# Patient Record
Sex: FEMALE | Race: WHITE | NOT HISPANIC OR LATINO | Employment: PART TIME | URBAN - METROPOLITAN AREA
[De-identification: names, ages, dates, MRNs, and addresses within clinical notes are randomized per-mention and may not be internally consistent; named-entity substitution may affect disease eponyms.]

---

## 2023-06-23 ENCOUNTER — LAB REQUISITION (OUTPATIENT)
Dept: LAB | Facility: CLINIC | Age: 18
End: 2023-06-23
Payer: COMMERCIAL

## 2023-06-23 DIAGNOSIS — L70.0 ACNE VULGARIS: ICD-10-CM

## 2023-06-23 LAB
ALBUMIN SERPL BCG-MCNC: 4.4 G/DL (ref 3.5–5.2)
ALP SERPL-CCNC: 66 U/L (ref 35–104)
ALT SERPL W P-5'-P-CCNC: 16 U/L (ref 0–50)
AST SERPL W P-5'-P-CCNC: 37 U/L (ref 0–45)
BASOPHILS # BLD AUTO: 0 10E3/UL (ref 0–0.2)
BASOPHILS NFR BLD AUTO: 0 %
BILIRUB DIRECT SERPL-MCNC: <0.2 MG/DL (ref 0–0.3)
BILIRUB SERPL-MCNC: 0.4 MG/DL
CHOLEST SERPL-MCNC: 140 MG/DL
EOSINOPHIL # BLD AUTO: 0.1 10E3/UL (ref 0–0.7)
EOSINOPHIL NFR BLD AUTO: 2 %
ERYTHROCYTE [DISTWIDTH] IN BLOOD BY AUTOMATED COUNT: 13 % (ref 10–15)
HCT VFR BLD AUTO: 40.8 % (ref 35–47)
HDLC SERPL-MCNC: 42 MG/DL
HGB BLD-MCNC: 14 G/DL (ref 11.7–15.7)
IMM GRANULOCYTES # BLD: 0 10E3/UL
IMM GRANULOCYTES NFR BLD: 1 %
LDLC SERPL CALC-MCNC: 88 MG/DL
LYMPHOCYTES # BLD AUTO: 2 10E3/UL (ref 0.8–5.3)
LYMPHOCYTES NFR BLD AUTO: 42 %
MCH RBC QN AUTO: 30.2 PG (ref 26.5–33)
MCHC RBC AUTO-ENTMCNC: 34.3 G/DL (ref 31.5–36.5)
MCV RBC AUTO: 88 FL (ref 78–100)
MONOCYTES # BLD AUTO: 0.4 10E3/UL (ref 0–1.3)
MONOCYTES NFR BLD AUTO: 8 %
NEUTROPHILS # BLD AUTO: 2.2 10E3/UL (ref 1.6–8.3)
NEUTROPHILS NFR BLD AUTO: 47 %
NONHDLC SERPL-MCNC: 98 MG/DL
NRBC # BLD AUTO: 0 10E3/UL
NRBC BLD AUTO-RTO: 0 /100
PLATELET # BLD AUTO: NORMAL 10*3/UL
PROT SERPL-MCNC: 7.1 G/DL (ref 6.4–8.3)
RBC # BLD AUTO: 4.64 10E6/UL (ref 3.8–5.2)
TRIGL SERPL-MCNC: 52 MG/DL
WBC # BLD AUTO: 4.7 10E3/UL (ref 4–11)

## 2023-06-23 PROCEDURE — 80076 HEPATIC FUNCTION PANEL: CPT | Performed by: NURSE PRACTITIONER

## 2023-06-23 PROCEDURE — 85014 HEMATOCRIT: CPT | Performed by: NURSE PRACTITIONER

## 2023-06-23 PROCEDURE — 85048 AUTOMATED LEUKOCYTE COUNT: CPT | Performed by: NURSE PRACTITIONER

## 2023-06-23 PROCEDURE — 80061 LIPID PANEL: CPT | Mod: ORL | Performed by: NURSE PRACTITIONER

## 2023-10-02 ENCOUNTER — HOSPITAL ENCOUNTER (EMERGENCY)
Facility: CLINIC | Age: 18
Discharge: HOME | End: 2023-10-03
Attending: EMERGENCY MEDICINE | Admitting: EMERGENCY MEDICINE
Payer: COMMERCIAL

## 2023-10-02 DIAGNOSIS — F43.23 ADJUSTMENT DISORDER WITH MIXED ANXIETY AND DEPRESSED MOOD: ICD-10-CM

## 2023-10-02 PROCEDURE — 99285 EMERGENCY DEPT VISIT HI MDM: CPT | Performed by: EMERGENCY MEDICINE

## 2023-10-02 PROCEDURE — 99284 EMERGENCY DEPT VISIT MOD MDM: CPT | Performed by: EMERGENCY MEDICINE

## 2023-10-02 RX ORDER — CEFDINIR 300 MG/1
300 CAPSULE ORAL
COMMUNITY
Start: 2023-09-27 | End: 2023-10-04

## 2023-10-02 RX ORDER — ISOTRETINOIN 30 MG/1
60 CAPSULE ORAL
COMMUNITY
Start: 2023-09-23

## 2023-10-02 RX ORDER — SERTRALINE HYDROCHLORIDE 25 MG/1
TABLET, FILM COATED ORAL
COMMUNITY
Start: 2023-08-24

## 2023-10-02 RX ORDER — DEXTROAMPHETAMINE SACCHARATE, AMPHETAMINE ASPARTATE MONOHYDRATE, DEXTROAMPHETAMINE SULFATE AND AMPHETAMINE SULFATE 2.5; 2.5; 2.5; 2.5 MG/1; MG/1; MG/1; MG/1
10 CAPSULE, EXTENDED RELEASE ORAL
COMMUNITY
Start: 2023-09-06

## 2023-10-02 ASSESSMENT — ACTIVITIES OF DAILY LIVING (ADL): ADLS_ACUITY_SCORE: 35

## 2023-10-03 VITALS
HEART RATE: 74 BPM | RESPIRATION RATE: 16 BRPM | SYSTOLIC BLOOD PRESSURE: 114 MMHG | TEMPERATURE: 97.6 F | DIASTOLIC BLOOD PRESSURE: 69 MMHG | BODY MASS INDEX: 28.28 KG/M2 | HEIGHT: 66 IN | OXYGEN SATURATION: 98 % | WEIGHT: 176 LBS

## 2023-10-03 PROBLEM — F90.9 ATTENTION DEFICIT HYPERACTIVITY DISORDER: Status: ACTIVE | Noted: 2023-10-03

## 2023-10-03 PROBLEM — F43.21 ADJUSTMENT DISORDER WITH DEPRESSED MOOD: Status: ACTIVE | Noted: 2023-10-03

## 2023-10-03 ASSESSMENT — ACTIVITIES OF DAILY LIVING (ADL): ADLS_ACUITY_SCORE: 35
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
## 2023-10-03 NOTE — CONSULTS
"Diagnostic Evaluation Consultation  Crisis Assessment    Patient Name: Wendi Vásquez  Age:  18 year old  Legal Sex: female  Gender Identity: female  Pronouns:   Race: White  Ethnicity: Not  or   Language: English      Patient was assessed: In person      Patient location: [facility] EMERGENCY DEPARTMENT                                 Referral Data and Chief Complaint  Wendi Vásquez presents to the ED with family/friends. Patient is presenting to the ED for the following concerns: Depression, Suicidal ideation.   Factors that make the mental health crisis life threatening or complex are:  Patient walked in with mom due to SI.  She denied having specific plans and intent.  She said the SI was \"on and off.\"  She denied NSSI and HI.  She denied prior suicide attempts.  Patient was sleepy, but oriented x 5.  She was calm and cooperative.  Patient said, \"My parents recently  and I'm having relationship issues with my girlfriend.  I'm so conflicted.  I want to continue dating Patience but there's toxic tendanceis.  I wanna explore other things.  She wants to tie me down.  II feel super-suffocated.  I wanna experiment with other people.  I went dancing right after my mom's engagement shoot.  I felt cared for and safe with that person.  I feel so guilty.  Patience is crazy and very controlling.  I spiraled today.  It's gotten worse, throughout the day.  I have to break up with her.  I tried twice.  I'm sick of it.  I have black and white thinking.  I can't take it.  I'm really codependent.  People say I don't need to make a decision right now, but I don't like to be in that gray area.  I'd want to force myself.  Life or suicide.  This is the worst I had SI in my entire life.  I wouldn't trust myself. I want to leave and I'm going to stay at my mom's.  I probably need a group.  My therapist is great.\"  Patient has been sleeping and eating, adequately.  She thought maybe she had nightmares.  " She stated that she dissociates, a lot.  She denied having flashbacks.  The most traumatic thing in her life was her parent's divorce.  It was a complete surpise to her.  She denied psychosis and richard.  Her insight, judgment, and impulse control were mostly intact at the time of this assessment..      Informed Consent and Assessment Methods  Explained the crisis assessment process, including applicable information disclosures and limits to confidentiality, assessed understanding of the process, and obtained consent to proceed with the assessment.  Assessment methods included conducting a formal interview with patient, review of medical records, collaboration with medical staff, and obtaining relevant collateral information from family and community providers when available.  : done     Patient response to interventions: acceptance expressed  Coping skills were attempted to reduce the crisis:  requested help and support.     History of the Crisis   Patient had prior diagnoses of Depression and ADHD.  There was no concern for substance use.  She takes prescribed meds.  She has not been admitted to a MH inpatient unit, before.  This was her first time in the ED for MH symptoms.  She has not gone through any intensive outpatient programs, before.    Brief Psychosocial History  Family:  Single, Children no  Support System:  Parent(s)  Employment Status:  employed full-time, student  Source of Income:  salary/wages  Financial Environmental Concerns:  No concerns identified  Current Hobbies:  social media/computer activities  Barriers in Personal Life:  mental health concerns  Patient lives with her girlfriend of almost two years, Patience and another couple who are male and female (patient noted.)  Mom said patient prefers she/they/he pronouns and goes by [employer]  Patient works at [employer].    Significant Clinical History  Current Anxiety Symptoms:  anxious  Current Depression/Trauma:  thoughts of death/suicide, excessive  guilt, sadness, hopelessness, helplessness, sense of doom  Current Somatic Symptoms:  anxious  Current Psychosis/Thought Disturbance:     Current Eating Symptoms:     Chemical Use History:  Alcohol: None  Benzodiazepines: None  Opiates: None  Cocaine: None  Marijuana: None  Other Use: None   Past diagnosis:  ADHD, Depression  Family history:  Substance Use Disorder  Past treatment:  Individual therapy, Primary Care  Details of most recent treatment:  Patient's med provider/PCP is retiring and she's agreeable to a Psychiatrist for med mgmt.  She has a therapist.  She wants group therapy.     Collateral Information  Is there collateral information: Yes     Collateral information name, relationship, phone number:  Shagufta Vásquez, mother, [phone], in person    What happened today: Mom got a call from patient's sister stating that patient was suicidal.  Mom said she'd meet her at the emergency room.  Patient struggles with her relationship with Patience and mom and dad's divorce.     What is different about patient's functioning: Mom's twin sister's were born and life has been lived out loud, mom said.     Concern about alcohol/drug use:      What do you think the patient needs:      Has patient made comments about wanting to kill themselves/others: yes    If d/c is recommended, can they take part in safety/aftercare planning:  yes    Additional collateral information:  Mom is a nurse at the [employer].  She said this is very difficult with the divorce on top of it.  She recently got engaged to a woman and she's the happiest she's ever been.  Patient's dad lives a mile away from her in [place] [facility].  He's funny, fun, has a great personality, but he's a chronic alcoholic.     Risk Assessment  [place] Suicide Severity Rating Scale Full Clinical Version:  Suicidal Ideation  Q1 Wish to be Dead (Lifetime): Yes  Q2 Non-Specific Active Suicidal Thoughts (Lifetime): Yes  3. Active Suicidal Ideation with any Methods (Not Plan) Without  Intent to Act (Lifetime): No  Q4 Active Suicidal Ideation with Some Intent to Act, Without Specific Plan (Lifetime): No  Q5 Active Suicidal Ideation with Specific Plan and Intent (Lifetime): No  Q6 Suicide Behavior (Lifetime): no     Suicidal Behavior (Lifetime)  Actual Attempt (Lifetime): No  Has subject engaged in non-suicidal self-injurious behavior? (Lifetime): No  Interrupted Attempts (Lifetime): No  Aborted or Self-Interrupted Attempt (Lifetime): No  Preparatory Acts or Behavior (Lifetime): No    [place] Suicide Severity Rating Scale Recent:   Suicidal Ideation (Recent)  Q1 Wished to be Dead (Past Month): yes  Q2 Suicidal Thoughts (Past Month): yes  Q3 Suicidal Thought Method: no  Q4 Suicidal Intent without Specific Plan: yes  Q5 Suicide Intent with Specific Plan: no  Level of Risk per Screen: high risk  Intensity of Ideation (Recent)  Most Severe Ideation Rating (Past 1 Month):  (patient said she was too tired to answer more questions.)  Suicidal Behavior (Recent)  Actual Attempt (Past 3 Months): No  Has subject engaged in non-suicidal self-injurious behavior? (Past 3 Months): No  Interrupted Attempts (Past 3 Months): No  Aborted or Self-Interrupted Attempt (Past 3 Months): No  Preparatory Acts or Behavior (Past 3 Months): No    Environmental or Psychosocial Events: challenging interpersonal relationships, other life stressors  Protective Factors: Protective Factors: help seeking    Does the patient have thoughts of harming others? Feels Like Hurting Others: no  Previous Attempt to Hurt Others: no  Is the patient engaging in sexually inappropriate behavior?: no    Is the patient engaging in sexually inappropriate behavior?  no        Mental Status Exam   Affect: Appropriate  Appearance: Appropriate  Attention Span/Concentration: Attentive  Eye Contact: Engaged    Fund of Knowledge: Appropriate   Language /Speech Content: Fluent  Language /Speech Volume: Normal  Language /Speech Rate/Productions:  "Normal  Recent Memory: Variable  Remote Memory: Variable  Mood: Sad, Depressed  Orientation to Person: Yes   Orientation to Place: Yes  Orientation to Time of Day: Yes  Orientation to Date: Yes     Situation (Do they understand why they are here?): Yes  Psychomotor Behavior: Normal  Thought Content: Clear, Suicidal  Thought Form: Intact       Medication  Psychotropic medications: Listed in med section of Epic.    Current Care Team  Patient Care Team:  Pediatrics, [facility] as PCP - General  Jo Chen, Therapist, [facility]    Diagnosis  Patient Active Problem List   Diagnosis Code    Adjustment disorder with depressed mood F43.21    Attention deficit hyperactivity disorder F90.9       Primary Problem This Admission  Active Hospital Problems    *Adjustment disorder with depressed mood      Attention deficit hyperactivity disorder        Clinical Summary and Substantiation of Recommendations   After therapeutic assessment, intervention and aftercare planning by ED care team and LM and in consultation with attending provider, the patient's circumstances and mental state were appropriate for outpatient management. It is the recommendation of this clinician that pt discharge with OP MH support. A this time the pt is not presenting as an acute risk to self or others due to the following factors: Patient said, \"Now I have a plan for tonight.  I'm going to my mom's.\"  She feels safe at mom's.  She denied current SI, NSSI, and HI.  She did not have psychosis or richard.  She was not aggressive.       Patient coping skills attempted to reduce the crisis:  requested help and support.    Disposition  Recommended disposition: Programmatic Care, Medication Management        Reviewed case and recommendations with attending provider. Attending Name: Laurence Peña MD/Leon Pinedo MD       Attending concurs with disposition: yes       Patient and/or validated legal guardian concurs with disposition:   yes       Final " disposition:  discharge    Legal status on admission: Voluntary/Patient has signed consent for treatment    Assessment Details   Total duration spent with the patient: 60 min     CPT code(s) utilized: 75798 - Psychotherapy for Crisis - 60 (30-74*) min    ALISHA Valencia, Psychotherapist  DEC - Triage & Transition Services  Callback: [phone]    Therapy Plans     ADULT MH EVAL  Date:   10/10/2023  Time:   2:00 PM   Length: 120 minutes  Provider: Jessi oHff, RA, LEENA      You have been scheduled with the [facility] for a Diagnostic Assessment appointment on 10/10/2023 date at 2PM  . Please allow up to 90 -120 minutes for your appointment. This is an IN-[place]ERSON [place] [address]     *Follow the signs to the Emergency Room and park in the Yellow or Red Ramp.  You can obtain a reduced parking fee of $2 after your appointment.  The office is located next to [facility].  The  office number is [phone].     Please arrive before you scheduled appointment time, late arrivals are subject to the need to reschedule.   Please bring contact names and phone numbers for Emergency Contacts, therapist, psychiatrist, PO, attorneys, or other relevant contacts that may be needed.     *Face masking is optional.     Please note: Parents must accompany any patient under the age of 18. Any patient under legal guardianship will need to bring court documents.     Child/ Adolescent appointments can last up to 120+ minutes.  Adult appointments can last up to 90+ minutes.     You will receive a phone call 2-4 days prior to your scheduled time to confirm and remind you of the appointment.       You will receive intake forms via [employer] ([email or web address]) to be completed prior to your appointment.  If able, please complete this prior to your appointment to reduce the appt length of  "time.       If you need to change this appointment for any reason, please call our Behavioral Access Scheduling office at [phone].  Please note, we ask for at least a 24-hour notice.  Any late cancelations will be considered a no-show.                    Scheduled Appointment  Date: Thursday, 10/5/2023    Time: 1:00 pm - 2:00 pm  Provider: Ariel Cabrera MA, CNPRN  Location: [facility], [address] [place] [address], [address], [place] [address]  Phone: [phone]  Type: Telepsychiatry     Patient Instructions  Please fill New Patient Form by using following link. All forms need to be completed 24 hours prior to the appointment date/time by going to [email or web address] Please call us on [phone] 96 hours prior to your scheduled appointment to confirm that you are able to attend. We will provide you information about how to log into video call software when you call.     Aftercare Plan  If I am feeling unsafe or I am in a crisis, I will:   Contact my established care providers   Call the National Suicide Prevention Lifeline: 988  Go to the nearest emergency room   Call 911      Warning signs that I or other people might notice when a crisis is developing for me: \"feeling guilty, black and white thinking, suicidal\"     Things I am able to do on my own to cope or help me feel better: \"Seeing my therapist helps.\"      Things that I am able to do with others to cope or help me better: \"I'll stay with my mom, tonight.\"      Things I can use or do for distraction: Work, school      Changes I can make to support my mental health and wellness: \"start going to a group\"      People in my life that I can ask for help: \"mom, therapist\"      Your county has a mental health crisis team you can call 24/7: [facility] Crisis  [phone] (adults)  [phone] (children)  and [facility] Crisis [phone]     Other things that are important when I'm in crisis: \"Get enough sleep, eat " "balanced meals, drink enough fluids\"      Additional resources and information:      Reduce Extreme Emotion  QUICKLY:  Changing Your Body Chemistry      T:  Change your body Temperature to change your autonomic nervous system   Use Ice Water to calm yourself down FAST   Put your face in a bowl of ice water (this is the best way; have the person keep his/her face in ice water for 30-45 seconds - initial research is showing that the longer s/he can hold her/his face in the water, the better the response), or   Splash ice water on your face, or hold an ice pack on your face      I:  Intensely exercise to calm down a body revved up by emotion   Examples: running, walking fast, jumping, playing basketball, weight lifting, swimming, calisthenics, etc.   Engage in exercises that DO NOT include violent behaviors. Exercises that utilize violent behaviors tend to function as  behavioral rehearsal,  and rather than calming the person down, may actually  rev  the person up more, increasing the likelihood of violence, and lessening the likelihood that they will  burn off  energy     P:  Progressively relax your muscles   Starting with your hands, moving to your forearms, upper arms, shoulders, neck, forehead, eyes, cheeks and lips, tongue and teeth, chest, upper back, stomach, buttocks, thighs, calves, ankles, feet   Tense (10 seconds,   of the way), then relax each muscle (all the way)   Notice the tension   Notice the difference when relaxed (by tensing first, and then relaxing, you are able to get a more thorough relaxation than by simply relaxing)      P: Paced breathing to relax   The standard technique is to begin with counting the number of steps one takes for a typical inhale, then counting the steps one takes for a typical exhale, and then lengthening the amount of steps for the exhalation by one or two steps.  OR  Repeat this pattern for 1-2 minutes  Inhale for four (4) seconds   Exhale for six (6) to eight (8) seconds " "  Research demonstrated that one can change one's overall level of anxiety by doing this exercise for even a few minutes per day            Crisis Lines  Crisis Text Line  Text 242316  You will be connected with a trained live crisis counselor to provide support.     Jean baumann, anthonyo  BRIAN a 712208 o texto a 442-AYUDAME en WhatsApp     The [employer] (LGBTQ Youth Crisis Line)  8.689.824.4428  text START to 802-317        Community Threadflip  Fast Tracker  Linking people to mental health and substance use disorder resources  ImaCor.Xenoport      [place] Mental Health Warm Line  Peer to peer support  Monday thru Saturday, 12 pm to 10 pm  197.814.5264 or 2.406.721.7142  Text \"Support\" to 07667     National Ada on Mental Illness (JC)  577.216.9969 or 1.888.JC.HELPS  They offer groups and family support.        Mental Health Apps  My3  https://RxRevu.org/     VirtualHopeBox  https://Qudini/apps/virtual-hope-box/        Additional Information  Today you were seen by a licensed mental health professional through Triage and Transition services, Behavioral Healthcare Providers ([facility])  for a crisis assessment in the Emergency Department at [facility].  It is recommended that you follow up with your established providers (psychiatrist, mental health therapist, and/or primary care doctor - as relevant) as soon as possible. Coordinators from [facility] will be calling you in the next 24-48 hours to ensure that you have the resources you need.  You can also contact [facility] coordinators directly at [phone]. You may have been scheduled for or offered an appointment with a mental health provider. [facility] maintains an extensive network of licensed behavioral health providers to connect patients with the services they need.  We do not charge providers a fee to participate in our referral network.  We match patients with providers based on a patient's specific needs, insurance coverage, and " location.  Our first effort will be to refer you to a provider within your care system, and will utilize providers outside your care system as needed.

## 2023-10-03 NOTE — DISCHARGE INSTRUCTIONS
Continue working with your current therapist.  See if they can see you twice weekly.     Stay with your mother tonight and as needed to give yourself a safe and supportive space.     Therapy Plans    ADULT MH EVAL  Date: 10/10/2023  Time: 2:00 PM   Length: 120 minutes  Provider: Jessi Hoff, RA, DIVYAC     You have been scheduled with the [facility] for a Diagnostic Assessment appointment on 10/10/2023 date at 2PM  . Please allow up to 90 -120 minutes for your appointment. This is an [place] [address]    *Follow the signs to the Emergency Room and park in the Yellow or Red Ramp.  You can obtain a reduced parking fee of $2 after your appointment.  The office is located next to [facility].  The  office number is [phone].    Please arrive before you scheduled appointment time, late arrivals are subject to the need to reschedule.   Please bring contact names and phone numbers for Emergency Contacts, therapist, psychiatrist, PO, attorneys, or other relevant contacts that may be needed.    *Face masking is optional.    Please note: Parents must accompany any patient under the age of 18. Any patient under legal guardianship will need to bring court documents.    Child/ Adolescent appointments can last up to 120+ minutes.  Adult appointments can last up to 90+ minutes.    You will receive a phone call 2-4 days prior to your scheduled time to confirm and remind you of the appointment.      You will receive intake forms via [employer] ([email or web address]) to be completed prior to your appointment.  If able, please complete this prior to your appointment to reduce the appt length of time.      If you need to change this appointment for any reason, please call our Behavioral Access Scheduling office at [phone].  Please note, we ask for at least a 24-hour notice.  Any late  "cancelations will be considered a no-show.         Scheduled Appointment  Date: Thursday, 10/5/2023    Time: 1:00 pm - 2:00 pm  Provider: Ariel Cabrera MA, CNP,RN  Location: [facility], [address] [place] [address], [address], [place] [address]  Phone: [phone]  Type: Telepsychiatry    Patient Instructions  Please fill New Patient Form by using following link. All forms need to be completed 24 hours prior to the appointment date/time by going to [email or web address] Please call us on [phone] 96 hours prior to your scheduled appointment to confirm that you are able to attend. We will provide you information about how to log into video call software when you call.    Aftercare Plan  If I am feeling unsafe or I am in a crisis, I will:   Contact my established care providers   Call the National Suicide Prevention Lifeline: 988  Go to the nearest emergency room   Call 911     Warning signs that I or other people might notice when a crisis is developing for me: \"feeling guilty, black and white thinking, suicidal\"    Things I am able to do on my own to cope or help me feel better: \"Seeing my therapist helps.\"     Things that I am able to do with others to cope or help me better: \"I'll stay with my mom, tonight.\"     Things I can use or do for distraction: Work, school     Changes I can make to support my mental health and wellness: \"start going to a group\"     People in my life that I can ask for help: \"mom, therapist\"     Your county has a mental health crisis team you can call 24/7: [facility]  [phone] (adults)  [phone] (children)  and [facility] [phone]    Other things that are important when I'm in crisis: \"Get enough sleep, eat balanced meals, drink enough fluids\"     Additional resources and information:     Reduce Extreme Emotion  QUICKLY:  Changing Your Body Chemistry      T:  Change your body Temperature to change your autonomic nervous " system   Use Ice Water to calm yourself down FAST   Put your face in a bowl of ice water (this is the best way; have the person keep his/her face in ice water for 30-45 seconds - initial research is showing that the longer s/he can hold her/his face in the water, the better the response), or   Splash ice water on your face, or hold an ice pack on your face      I:  Intensely exercise to calm down a body revved up by emotion   Examples: running, walking fast, jumping, playing basketball, weight lifting, swimming, calisthenics, etc.   Engage in exercises that DO NOT include violent behaviors. Exercises that utilize violent behaviors tend to function as  behavioral rehearsal,  and rather than calming the person down, may actually  rev  the person up more, increasing the likelihood of violence, and lessening the likelihood that they will  burn off  energy     P:  Progressively relax your muscles   Starting with your hands, moving to your forearms, upper arms, shoulders, neck, forehead, eyes, cheeks and lips, tongue and teeth, chest, upper back, stomach, buttocks, thighs, calves, ankles, feet   Tense (10 seconds,   of the way), then relax each muscle (all the way)   Notice the tension   Notice the difference when relaxed (by tensing first, and then relaxing, you are able to get a more thorough relaxation than by simply relaxing)     P: Paced breathing to relax   The standard technique is to begin with counting the number of steps one takes for a typical inhale, then counting the steps one takes for a typical exhale, and then lengthening the amount of steps for the exhalation by one or two steps.  OR  Repeat this pattern for 1-2 minutes  Inhale for four (4) seconds   Exhale for six (6) to eight (8) seconds   Research demonstrated that one can change one's overall level of anxiety by doing this exercise for even a few minutes per day          Crisis Lines  Crisis Text Line  Text 230822  You will be connected with a  "trained live crisis counselor to provide support.    Por pastor, texto  BRIAN a 412511 o texto a 442-AYUDAME en WhatsApp    [employer] (LGBTQ Youth Crisis Line)  2.820.866.2782  text START to 844-063      Community Resources  Fast Tracker  Linking people to mental health and substance use disorder resources  [employer]iPling     [place] Mental Health Warm Line  Peer to peer support  Monday thru Saturday, 12 pm to 10 pm  436.861.9487 or 4.417.985.1779  Text \"Support\" to 38016    National Bouse on Mental Illness (JC)  301.856.4757 or 1.888.JC.HELPS  They offer groups and family support.      Mental Health Apps  My3  https://[employer].org/    VirtualHopeBox  https://[employer]/apps/virtual-hope-box/      Additional Information  Today you were seen by a licensed mental health professional through Triage and Transition services, [facility] ([facility])  for a crisis assessment in the Emergency Department at [facility].  It is recommended that you follow up with your established providers (psychiatrist, mental health therapist, and/or primary care doctor - as relevant) as soon as possible. Coordinators from [facility] will be calling you in the next 24-48 hours to ensure that you have the resources you need.  You can also contact [facility] coordinators directly at [phone]. You may have been scheduled for or offered an appointment with a mental health provider. [facility] maintains an extensive network of licensed behavioral health providers to connect patients with the services they need.  We do not charge providers a fee to participate in our referral network.  We match patients with providers based on a patient's specific needs, insurance coverage, and location.  Our first effort will be to refer you to a provider within your care system, and will utilize providers outside your care system as needed.         "

## 2023-10-10 ENCOUNTER — HOSPITAL ENCOUNTER (OUTPATIENT)
Dept: BEHAVIORAL HEALTH | Facility: CLINIC | Age: 18
Discharge: HOME | End: 2023-10-10
Attending: FAMILY MEDICINE
Payer: COMMERCIAL

## 2023-10-10 PROCEDURE — 90791 PSYCH DIAGNOSTIC EVALUATION: CPT | Performed by: COUNSELOR

## 2023-10-10 ASSESSMENT — ANXIETY QUESTIONNAIRES
IF YOU CHECKED OFF ANY PROBLEMS ON THIS QUESTIONNAIRE, HOW DIFFICULT HAVE THESE PROBLEMS MADE IT FOR YOU TO DO YOUR WORK, TAKE CARE OF THINGS AT HOME, OR GET ALONG WITH OTHER PEOPLE: EXTREMELY DIFFICULT
6. BECOMING EASILY ANNOYED OR IRRITABLE: NEARLY EVERY DAY
3. WORRYING TOO MUCH ABOUT DIFFERENT THINGS: NEARLY EVERY DAY
2. NOT BEING ABLE TO STOP OR CONTROL WORRYING: MORE THAN HALF THE DAYS
GAD7 TOTAL SCORE: 19
7. FEELING AFRAID AS IF SOMETHING AWFUL MIGHT HAPPEN: MORE THAN HALF THE DAYS
5. BEING SO RESTLESS THAT IT IS HARD TO SIT STILL: NEARLY EVERY DAY
GAD7 TOTAL SCORE: 19
1. FEELING NERVOUS, ANXIOUS, OR ON EDGE: NEARLY EVERY DAY

## 2023-10-10 ASSESSMENT — COLUMBIA-SUICIDE SEVERITY RATING SCALE - C-SSRS
TOTAL  NUMBER OF INTERRUPTED ATTEMPTS SINCE LAST CONTACT: NO
6. HAVE YOU EVER DONE ANYTHING, STARTED TO DO ANYTHING, OR PREPARED TO DO ANYTHING TO END YOUR LIFE?: NO
TOTAL  NUMBER OF ABORTED OR SELF INTERRUPTED ATTEMPTS SINCE LAST CONTACT: NO
SUICIDE, SINCE LAST CONTACT: NO
1. SINCE LAST CONTACT, HAVE YOU WISHED YOU WERE DEAD OR WISHED YOU COULD GO TO SLEEP AND NOT WAKE UP?: SEE BELOW
ATTEMPT SINCE LAST CONTACT: NO
1. SINCE LAST CONTACT, HAVE YOU WISHED YOU WERE DEAD OR WISHED YOU COULD GO TO SLEEP AND NOT WAKE UP?: YES
2. HAVE YOU ACTUALLY HAD ANY THOUGHTS OF KILLING YOURSELF?: NO

## 2023-10-10 ASSESSMENT — PATIENT HEALTH QUESTIONNAIRE - PHQ9
5. POOR APPETITE OR OVEREATING: NEARLY EVERY DAY
SUM OF ALL RESPONSES TO PHQ QUESTIONS 1-9: 18

## 2023-10-10 NOTE — PROGRESS NOTES
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 initiated: Day Treatment/IOP. Next Scheduled Appointment: TBD, patient reports she wanted to hold off on referral to navigators [facility]. Patient reports she is not taking any time off from work or school and the current schedule would be challenging to make work and she would have to move things around and talk to her work or school. Patient reports she wanted referrals to DBT and other community ADT/IOP options. The writer provided this to patient via printed resources along with the navigators contact information if she changed her mind. Patient is aware to contact navigators for any follow up questions or needs.     A Release of Information has been obtained for the following:  none identified .     Clinical Substantiation/medical necessity for the above recommendations: Patient was referred by ED and recently worked with ED providers due to increase in mental health symptoms and SI.  Patient is already working with a pcp/psychiatrist and therapist and is struggling to engage in her daily routine and responsibilities. Patient would appear to benefit from ADT/IOP to provide additional structured support and routine in addressing her symptoms to prevent  "further deterioration leading to a higher level of care.    7. CHARAN:    CHARAN:  Recommendations:  No issues identified at this time .     8. Records:   These were reviewed at time of assessment.   Information in this assessment was obtained from the medical record and  provided by patient who is a good historian.    Patient will have open access to their mental health medical record.    9.   Interactive Complexity: No    10. Safety Plan:  When the patient identifies the following:  Wish to die    The following is recommended:   Complete/Review/Update Safety Plan    Safety Plan:  Adult Long Safety Plan:     [facility]                                       Wendi Vásquez     SAFETY PLAN:  Step 1: Warning signs / cues (Thoughts, images, mood, situation, behavior) that a crisis may be developing:  Thoughts: \"I can't do this anymore\", \"I just want this to end\", and \"Nothing makes it better\"  Images:  none identified  Thinking Processes: ruminations (can't stop thinking about my problems), racing thoughts, intrusive thoughts (bothersome, unwanted thoughts that come out of nowhere), and highly critical and negative thoughts  Mood: worsening depression, hopelessness, helplessness, intense anger, and intense worry  Behaviors: aggression, not taking care of myself, not taking care of my responsibilities, sleeping too much, not sleeping enough, and increasing frequency and duration of dissociation  Situations: relationship problems, trauma , and financial stress   Step 2: Coping strategies - Things I can do to take my mind off of my problems without contacting another person (relaxation technique, physical activity):  Distress Tolerance Strategies:  Watch videos on phone and listen to funny stuff, listen to funny podcast, going out to eat, thrifting  Physical Activities: go for a walk and deep breathing  Focus on helpful thoughts:  \"This is temporary\", \"I will get through " "this\", and remind myself of what is important to me: family and friends  Step 3: People and social settings that provide distraction:   Name: Odalis Phone: in her phone   Name: Lorir Phone: in her phone   Name: Lynda Phone: in her phone  park and shopping    Step 4: Remind myself of people and things that are important to me and worth living for:  friends and family  Step 5: When I am in crisis, I can ask these people to help me use my safety plan:   Name: Jo Chen Phone: in her phone   Name: Shagufta Staton Phone: [phone]  Step 6: Making the environment safe:   be around others  Step 7: Professionals or agencies I can contact during a crisis:  Suicide Prevention Lifeline: 6-630-080-BGFO (8291)  Crisis Text Line Service (available 24 hours a day, 7 days a week): Text [place] to 111680  Call  **CRISIS (567444) from a cell phone to talk to a team of professionals who can help you.  Crisis Services By [facility]: Phone Number:   [place]     [phone]   [place]    [phone]   [place]    [phone]   [place]    [phone]   [place]    [phone]   [place] [phone]   [place]     [phone]     Call 911 or go to my nearest emergency department.   I helped develop this safety plan and agree to use it when needed.  I have been given a copy of this plan.    Client signature _________________________________________________________________  Today s date:  10/10/2023  Completed by Provider Name/ Credentials:  Jessi Hoff, [facility], [facility][note 3]   October 10, 2023  Adapted from Safety Plan Template 2008 Crissy Thomas and Vipul Dunne is reprinted with the express permission of the authors.  No portion of the Safety Plan Template may be reproduced without the express, written permission.  You can contact the authors at [email or web address] or [email or web address].    Provider Name/ Credentials:  Jessi Hoff, [facility], [facility]   October 10, 2023      LOCUS Worksheet     Name: Wendi Vásquez MRN: " [id number]    : 2005      Gender:  female    PMI:  BCBS   Provider Name: RA Haile, LEENA    Provider NPI:  [id number]     Actual level of Care Provided:  pcp and therapy    Service(s) receiving or referred to:  ADT/IOP    Reason for Variance: Patient was referred by ED and recently worked with ED providers due to increase in mental health symptoms and SI.  Patient is already working with a pcp/psychiatrist and therapist and is struggling to engage in her daily routine and responsibilities. Patient would appear to benefit from ADT/IOP to provide additional structured support and routine in addressing her symptoms to prevent further deterioration leading to a higher level of care.    Rating completed by: RA Haile, LEENA       I. Risk of Harm:   3      Moderate Risk of Harm    II. Functional Status:   3      Moderate Impairment    III. Co-Morbidity:   1      No Co-Morbidity    IV - A. Recovery Environment - Level of Stress:   3      Moderately Stress Environment    IV - B. Recovery Environment - Level of Support:   3      Limited Support in Environment    V. Treatment and Recovery History:   2      Significant Response to Treatment and Recovery Management    VI. Engagement and Recovery Project:   3      Limited Engagement and Recovery       18 Composite Score    Level of Care Recommendation:   17 to 19       High Intensity Community Based Services

## 2024-03-10 ENCOUNTER — HEALTH MAINTENANCE LETTER (OUTPATIENT)
Age: 19
End: 2024-03-10

## 2025-03-16 ENCOUNTER — HEALTH MAINTENANCE LETTER (OUTPATIENT)
Age: 20
End: 2025-03-16